# Patient Record
(demographics unavailable — no encounter records)

---

## 2025-01-02 NOTE — REASON FOR VISIT
[Follow-Up] : a follow-up visit for [Patient] : patient [Mother] : mother [FreeTextEntry3] : immune complex nephropathy

## 2025-01-02 NOTE — PHYSICAL EXAM
[Well Developed] : well developed [Well Nourished] : well nourished [Normal] : alert, oriented as age-appropriate, affect appropriate; no weakness, no facial asymmetry, moves all extremities normal gait- child older than 18 months [de-identified] : deferred

## 2025-01-02 NOTE — PHYSICAL EXAM
[Well Developed] : well developed [Well Nourished] : well nourished [Normal] : alert, oriented as age-appropriate, affect appropriate; no weakness, no facial asymmetry, moves all extremities normal gait- child older than 18 months [de-identified] : deferred

## 2025-06-30 NOTE — PHYSICAL EXAM
[Well Developed] : well developed [Well Nourished] : well nourished [Normal] : alert, oriented as age-appropriate, affect appropriate; no weakness, no facial asymmetry, moves all extremities normal gait- child older than 18 months [de-identified] : deferred